# Patient Record
Sex: MALE | Race: WHITE | NOT HISPANIC OR LATINO | URBAN - NONMETROPOLITAN AREA
[De-identification: names, ages, dates, MRNs, and addresses within clinical notes are randomized per-mention and may not be internally consistent; named-entity substitution may affect disease eponyms.]

---

## 2017-07-05 ENCOUNTER — OPTICAL OFFICE (OUTPATIENT)
Dept: URBAN - NONMETROPOLITAN AREA CLINIC 4 | Facility: CLINIC | Age: 22
Setting detail: OPHTHALMOLOGY
End: 2017-07-05
Payer: COMMERCIAL

## 2017-07-05 DIAGNOSIS — H52.13: ICD-10-CM

## 2017-07-05 PROCEDURE — S0500 DISPOS CONT LENS: HCPCS | Performed by: OPTOMETRIST

## 2017-10-16 ENCOUNTER — DOCTOR'S OFFICE (OUTPATIENT)
Dept: URBAN - NONMETROPOLITAN AREA CLINIC 1 | Facility: CLINIC | Age: 22
Setting detail: OPHTHALMOLOGY
End: 2017-10-16
Payer: COMMERCIAL

## 2017-10-16 ENCOUNTER — OPTICAL OFFICE (OUTPATIENT)
Dept: URBAN - NONMETROPOLITAN AREA CLINIC 4 | Facility: CLINIC | Age: 22
Setting detail: OPHTHALMOLOGY
End: 2017-10-16
Payer: COMMERCIAL

## 2017-10-16 DIAGNOSIS — Z01.00: ICD-10-CM

## 2017-10-16 DIAGNOSIS — H52.13: ICD-10-CM

## 2017-10-16 PROCEDURE — 92310 CONTACT LENS FITTING OU: CPT | Performed by: OPTOMETRIST

## 2017-10-16 PROCEDURE — S0500 DISPOS CONT LENS: HCPCS | Performed by: OPTOMETRIST

## 2017-10-16 PROCEDURE — 92014 COMPRE OPH EXAM EST PT 1/>: CPT | Performed by: OPTOMETRIST

## 2017-10-16 ASSESSMENT — SPHEQUIV_DERIVED
OD_SPHEQUIV: -1.875
OS_SPHEQUIV: -1.625

## 2017-10-16 ASSESSMENT — REFRACTION_AUTOREFRACTION
OD_CYLINDER: -0.25
OD_SPHERE: -1.75
OD_AXIS: 169
OS_SPHERE: -1.25
OS_CYLINDER: -0.75
OS_AXIS: 018

## 2017-10-16 ASSESSMENT — REFRACTION_OUTSIDERX
OS_SPHERE: -1.50
OS_VA1: 20/20
OD_CYLINDER: -0.25
OD_VA2: 20/20
OU_VA: 20/20
OD_VA1: 20/20
OS_VA2: 20/20
OS_AXIS: 020
OS_CYLINDER: -0.50
OD_VA3: 20/
OD_SPHERE: -1.50
OS_VA3: 20/
OD_AXIS: 170

## 2017-10-16 ASSESSMENT — REFRACTION_MANIFEST
OD_VA3: 20/
OU_VA: 20/
OD_VA2: 20/
OS_VA3: 20/
OS_VA1: 20/
OD_VA2: 20/
OD_VA3: 20/
OS_VA2: 20/
OS_VA1: 20/
OS_VA2: 20/
OD_VA1: 20/
OD_VA1: 20/
OS_VA3: 20/
OU_VA: 20/

## 2017-10-16 ASSESSMENT — REFRACTION_CURRENTRX
OS_OVR_VA: 20/
OD_OVR_VA: 20/
OD_OVR_VA: 20/
OS_VPRISM_DIRECTION: SV
OD_SPHERE: -1.50
OD_VPRISM_DIRECTION: SV
OS_SPHERE: -1.50
OD_OVR_VA: 20/
OS_OVR_VA: 20/
OS_OVR_VA: 20/

## 2017-10-16 ASSESSMENT — VISUAL ACUITY
OS_BCVA: 20/25+2
OD_BCVA: 20/20-1

## 2017-10-16 ASSESSMENT — CONFRONTATIONAL VISUAL FIELD TEST (CVF)
OD_FINDINGS: FULL
OS_FINDINGS: FULL

## 2018-07-30 ENCOUNTER — OPTICAL OFFICE (OUTPATIENT)
Dept: URBAN - NONMETROPOLITAN AREA CLINIC 4 | Facility: CLINIC | Age: 23
Setting detail: OPHTHALMOLOGY
End: 2018-07-30

## 2018-07-30 DIAGNOSIS — H52.13: ICD-10-CM

## 2018-07-30 PROCEDURE — S0500 DISPOS CONT LENS: HCPCS | Performed by: OPTOMETRIST

## 2018-10-19 ENCOUNTER — DOCTOR'S OFFICE (OUTPATIENT)
Dept: URBAN - NONMETROPOLITAN AREA CLINIC 1 | Facility: CLINIC | Age: 23
Setting detail: OPHTHALMOLOGY
End: 2018-10-19
Payer: COMMERCIAL

## 2018-10-19 DIAGNOSIS — H52.13: ICD-10-CM

## 2018-10-19 PROCEDURE — 92014 COMPRE OPH EXAM EST PT 1/>: CPT | Performed by: OPTOMETRIST

## 2018-10-19 PROCEDURE — 92310 CONTACT LENS FITTING OU: CPT | Performed by: OPTOMETRIST

## 2018-10-19 ASSESSMENT — SPHEQUIV_DERIVED
OS_SPHEQUIV: -1.375
OD_SPHEQUIV: -1.5
OD_SPHEQUIV: -1.625
OS_SPHEQUIV: -1.375

## 2018-10-19 ASSESSMENT — REFRACTION_MANIFEST
OS_AXIS: 010
OD_VA2: 20/
OS_VA3: 20/
OS_VA1: 20/20
OS_VA2: 20/20
OD_VA3: 20/
OS_SPHERE: -1.00
OD_AXIS: 170
OD_VA1: 20/
OU_VA: 20/20
OS_VA3: 20/
OS_CYLINDER: -0.75
OD_SPHERE: -1.50
OD_VA3: 20/
OS_VA1: 20/
OD_CYLINDER: -0.25
OU_VA: 20/
OS_VA2: 20/
OD_VA2: 20/20
OD_VA1: 20/20

## 2018-10-19 ASSESSMENT — REFRACTION_CURRENTRX
OS_SPHERE: -1.50
OD_OVR_VA: 20/
OS_OVR_VA: 20/
OS_OVR_VA: 20/
OD_SPHERE: -1.50
OD_VPRISM_DIRECTION: SV
OS_OVR_VA: 20/
OD_OVR_VA: 20/
OD_OVR_VA: 20/
OS_VPRISM_DIRECTION: SV

## 2018-10-19 ASSESSMENT — REFRACTION_AUTOREFRACTION
OS_SPHERE: -0.75
OD_CYLINDER: -1.00
OD_SPHERE: -1.00
OS_AXIS: 011
OS_CYLINDER: -1.25
OD_AXIS: 002

## 2018-10-19 ASSESSMENT — VISUAL ACUITY
OS_BCVA: 20/30+1
OD_BCVA: 20/30+1

## 2018-10-19 ASSESSMENT — CONFRONTATIONAL VISUAL FIELD TEST (CVF)
OD_FINDINGS: FULL
OS_FINDINGS: FULL

## 2018-11-13 ENCOUNTER — OPTICAL OFFICE (OUTPATIENT)
Dept: URBAN - NONMETROPOLITAN AREA CLINIC 4 | Facility: CLINIC | Age: 23
Setting detail: OPHTHALMOLOGY
End: 2018-11-13

## 2018-11-13 DIAGNOSIS — H52.13: ICD-10-CM

## 2018-11-13 PROCEDURE — S0500 DISPOS CONT LENS: HCPCS | Performed by: OPTOMETRIST

## 2019-10-21 ENCOUNTER — DOCTOR'S OFFICE (OUTPATIENT)
Dept: URBAN - NONMETROPOLITAN AREA CLINIC 1 | Facility: CLINIC | Age: 24
Setting detail: OPHTHALMOLOGY
End: 2019-10-21
Payer: COMMERCIAL

## 2019-10-21 DIAGNOSIS — H52.13: ICD-10-CM

## 2019-10-21 PROCEDURE — 92014 COMPRE OPH EXAM EST PT 1/>: CPT | Performed by: OPTOMETRIST

## 2019-10-21 PROCEDURE — 92310 CONTACT LENS FITTING OU: CPT | Performed by: OPTOMETRIST

## 2019-10-21 ASSESSMENT — VISUAL ACUITY
OD_BCVA: 20/30-1
OS_BCVA: 20/30-1

## 2019-10-21 ASSESSMENT — REFRACTION_CURRENTRX
OD_VPRISM_DIRECTION: SV
OS_SPHERE: -1.00
OD_OVR_VA: 20/
OS_OVR_VA: 20/
OD_SPHERE: -1.50
OD_AXIS: 170
OS_OVR_VA: 20/
OD_OVR_VA: 20/
OS_OVR_VA: 20/
OD_OVR_VA: 20/
OS_CYLINDER: -0.75
OS_VPRISM_DIRECTION: SV
OS_AXIS: 010
OD_CYLINDER: -0.25

## 2019-10-21 ASSESSMENT — CONFRONTATIONAL VISUAL FIELD TEST (CVF)
OD_FINDINGS: FULL
OS_FINDINGS: FULL

## 2019-10-21 ASSESSMENT — REFRACTION_AUTOREFRACTION
OS_AXIS: 003
OS_CYLINDER: -1.25
OD_CYLINDER: -0.75
OS_SPHERE: -1.00
OD_AXIS: 171
OD_SPHERE: -1.00

## 2019-10-21 ASSESSMENT — REFRACTION_MANIFEST
OD_CYLINDER: -0.50
OS_VA3: 20/
OS_VA2: 20/
OD_VA3: 20/
OU_VA: 20/20
OS_SPHERE: -1.00
OS_VA2: 20/20
OD_SPHERE: -1.00
OS_AXIS: 005
OS_VA1: 20/20
OS_VA1: 20/
OS_VA3: 20/
OD_VA2: 20/20
OD_VA3: 20/
OU_VA: 20/
OD_VA2: 20/
OD_VA1: 20/
OS_CYLINDER: -1.00
OD_VA1: 20/20
OD_AXIS: 170

## 2019-10-21 ASSESSMENT — SPHEQUIV_DERIVED
OS_SPHEQUIV: -1.5
OD_SPHEQUIV: -1.25
OD_SPHEQUIV: -1.375
OS_SPHEQUIV: -1.625

## 2019-11-20 ENCOUNTER — OPTICAL OFFICE (OUTPATIENT)
Dept: URBAN - NONMETROPOLITAN AREA CLINIC 4 | Facility: CLINIC | Age: 24
Setting detail: OPHTHALMOLOGY
End: 2019-11-20

## 2019-11-20 DIAGNOSIS — H52.13: ICD-10-CM

## 2019-11-20 PROCEDURE — S0500 DISPOS CONT LENS: HCPCS | Performed by: OPTOMETRIST

## 2020-06-10 ENCOUNTER — OPTICAL OFFICE (OUTPATIENT)
Dept: URBAN - NONMETROPOLITAN AREA CLINIC 4 | Facility: CLINIC | Age: 25
Setting detail: OPHTHALMOLOGY
End: 2020-06-10

## 2020-06-10 DIAGNOSIS — H52.13: ICD-10-CM

## 2020-06-10 PROCEDURE — S0500 DISPOS CONT LENS: HCPCS | Performed by: OPTOMETRIST

## 2021-08-24 ENCOUNTER — DOCTOR'S OFFICE (OUTPATIENT)
Dept: URBAN - NONMETROPOLITAN AREA CLINIC 1 | Facility: CLINIC | Age: 26
Setting detail: OPHTHALMOLOGY
End: 2021-08-24
Payer: COMMERCIAL

## 2021-08-24 ENCOUNTER — OPTICAL OFFICE (OUTPATIENT)
Dept: URBAN - NONMETROPOLITAN AREA CLINIC 4 | Facility: CLINIC | Age: 26
Setting detail: OPHTHALMOLOGY
End: 2021-08-24
Payer: COMMERCIAL

## 2021-08-24 VITALS — HEIGHT: 60 IN

## 2021-08-24 DIAGNOSIS — Z01.00: ICD-10-CM

## 2021-08-24 DIAGNOSIS — H52.13: ICD-10-CM

## 2021-08-24 PROCEDURE — 92310 CONTACT LENS FITTING OU: CPT | Performed by: OPTOMETRIST

## 2021-08-24 PROCEDURE — 92014 COMPRE OPH EXAM EST PT 1/>: CPT | Performed by: OPTOMETRIST

## 2021-08-24 PROCEDURE — S0500 DISPOS CONT LENS: HCPCS | Performed by: OPTOMETRIST

## 2021-08-24 ASSESSMENT — TONOMETRY
OS_IOP_MMHG: 14
OD_IOP_MMHG: 14

## 2021-08-24 ASSESSMENT — REFRACTION_CURRENTRX
OD_AXIS: 170
OD_CYLINDER: -0.50
OD_SPHERE: -1.00
OD_OVR_VA: 20/
OS_AXIS: 005
OS_VPRISM_DIRECTION: SV
OS_OVR_VA: 20/
OD_VPRISM_DIRECTION: SV
OS_SPHERE: -1.00
OS_CYLINDER: -1.00

## 2021-08-24 ASSESSMENT — REFRACTION_AUTOREFRACTION
OS_AXIS: 006
OD_AXIS: 026
OS_SPHERE: +0.25
OD_CYLINDER: -0.50
OS_CYLINDER: -1.00
OD_SPHERE: +0.25

## 2021-08-24 ASSESSMENT — REFRACTION_MANIFEST
OD_VA2: 20/20
OS_VA1: 20/20
OS_CYLINDER: -1.00
OD_SPHERE: -1.00
OS_VA2: 20/20
OS_AXIS: 005
OD_AXIS: 170
OU_VA: 20/20
OD_CYLINDER: -0.50
OD_VA1: 20/20
OS_SPHERE: -1.00

## 2021-08-24 ASSESSMENT — CONFRONTATIONAL VISUAL FIELD TEST (CVF)
OS_FINDINGS: FULL
OD_FINDINGS: FULL

## 2021-08-24 ASSESSMENT — VISUAL ACUITY
OD_BCVA: 20/30-1
OS_BCVA: 20/30--2

## 2021-08-24 ASSESSMENT — SPHEQUIV_DERIVED
OS_SPHEQUIV: -0.25
OD_SPHEQUIV: 0
OD_SPHEQUIV: -1.25
OS_SPHEQUIV: -1.5

## 2023-01-18 ENCOUNTER — APPOINTMENT (EMERGENCY)
Dept: CT IMAGING | Facility: HOSPITAL | Age: 28
End: 2023-01-18

## 2023-01-18 ENCOUNTER — HOSPITAL ENCOUNTER (EMERGENCY)
Facility: HOSPITAL | Age: 28
Discharge: HOME | End: 2023-01-18
Attending: STUDENT IN AN ORGANIZED HEALTH CARE EDUCATION/TRAINING PROGRAM

## 2023-01-18 VITALS
DIASTOLIC BLOOD PRESSURE: 71 MMHG | OXYGEN SATURATION: 97 % | WEIGHT: 170 LBS | RESPIRATION RATE: 16 BRPM | BODY MASS INDEX: 23.03 KG/M2 | SYSTOLIC BLOOD PRESSURE: 138 MMHG | HEART RATE: 68 BPM | TEMPERATURE: 99 F | HEIGHT: 72 IN

## 2023-01-18 DIAGNOSIS — R10.31 RIGHT INGUINAL PAIN: Primary | ICD-10-CM

## 2023-01-18 LAB
ANION GAP SERPL CALCULATED.3IONS-SCNC: 7 MMOL/L (ref 4–13)
BASOPHILS # BLD AUTO: 0.04 THOUSANDS/ÂΜL (ref 0–0.1)
BASOPHILS NFR BLD AUTO: 1 % (ref 0–1)
BILIRUB UR QL STRIP: NEGATIVE
BUN SERPL-MCNC: 15 MG/DL (ref 5–25)
CALCIUM SERPL-MCNC: 9.5 MG/DL (ref 8.4–10.2)
CHLORIDE SERPL-SCNC: 103 MMOL/L (ref 96–108)
CLARITY UR: CLEAR
CO2 SERPL-SCNC: 28 MMOL/L (ref 21–32)
COLOR UR: YELLOW
CREAT SERPL-MCNC: 1.07 MG/DL (ref 0.6–1.3)
EOSINOPHIL # BLD AUTO: 0.11 THOUSAND/ÂΜL (ref 0–0.61)
EOSINOPHIL NFR BLD AUTO: 1 % (ref 0–6)
ERYTHROCYTE [DISTWIDTH] IN BLOOD BY AUTOMATED COUNT: 12.9 % (ref 11.6–15.1)
GFR SERPL CREATININE-BSD FRML MDRD: 94 ML/MIN/1.73SQ M
GLUCOSE SERPL-MCNC: 110 MG/DL (ref 65–140)
GLUCOSE UR STRIP-MCNC: NEGATIVE MG/DL
HCT VFR BLD AUTO: 46.5 % (ref 36.5–49.3)
HGB BLD-MCNC: 15 G/DL (ref 12–17)
HGB UR QL STRIP.AUTO: NEGATIVE
IMM GRANULOCYTES # BLD AUTO: 0.01 THOUSAND/UL (ref 0–0.2)
IMM GRANULOCYTES NFR BLD AUTO: 0 % (ref 0–2)
KETONES UR STRIP-MCNC: NEGATIVE MG/DL
LACTATE SERPL-SCNC: 1 MMOL/L (ref 0.5–2)
LEUKOCYTE ESTERASE UR QL STRIP: NEGATIVE
LYMPHOCYTES # BLD AUTO: 2.21 THOUSANDS/ÂΜL (ref 0.6–4.47)
LYMPHOCYTES NFR BLD AUTO: 29 % (ref 14–44)
MCH RBC QN AUTO: 28.4 PG (ref 26.8–34.3)
MCHC RBC AUTO-ENTMCNC: 32.3 G/DL (ref 31.4–37.4)
MCV RBC AUTO: 88 FL (ref 82–98)
MONOCYTES # BLD AUTO: 0.62 THOUSAND/ÂΜL (ref 0.17–1.22)
MONOCYTES NFR BLD AUTO: 8 % (ref 4–12)
NEUTROPHILS # BLD AUTO: 4.62 THOUSANDS/ÂΜL (ref 1.85–7.62)
NEUTS SEG NFR BLD AUTO: 61 % (ref 43–75)
NITRITE UR QL STRIP: NEGATIVE
NRBC BLD AUTO-RTO: 0 /100 WBCS
PH UR STRIP.AUTO: 6 [PH]
PLATELET # BLD AUTO: 297 THOUSANDS/UL (ref 149–390)
PMV BLD AUTO: 10 FL (ref 8.9–12.7)
POTASSIUM SERPL-SCNC: 3.6 MMOL/L (ref 3.5–5.3)
PROT UR STRIP-MCNC: NEGATIVE MG/DL
RBC # BLD AUTO: 5.29 MILLION/UL (ref 3.88–5.62)
SODIUM SERPL-SCNC: 138 MMOL/L (ref 135–147)
SP GR UR STRIP.AUTO: 1.01 (ref 1–1.03)
UROBILINOGEN UR QL STRIP.AUTO: 0.2 E.U./DL
WBC # BLD AUTO: 7.61 THOUSAND/UL (ref 4.31–10.16)

## 2023-01-18 RX ORDER — KETOROLAC TROMETHAMINE 30 MG/ML
15 INJECTION, SOLUTION INTRAMUSCULAR; INTRAVENOUS ONCE
Status: COMPLETED | OUTPATIENT
Start: 2023-01-18 | End: 2023-01-18

## 2023-01-18 RX ADMIN — KETOROLAC TROMETHAMINE 15 MG: 30 INJECTION, SOLUTION INTRAMUSCULAR at 19:52

## 2023-01-18 RX ADMIN — IOHEXOL 100 ML: 350 INJECTION, SOLUTION INTRAVENOUS at 20:36

## 2023-01-18 RX ADMIN — SODIUM CHLORIDE 1000 ML: 0.9 INJECTION, SOLUTION INTRAVENOUS at 19:54

## 2023-01-19 NOTE — DISCHARGE INSTRUCTIONS
Motrin 600 mg every 6 hours and Tylenol 1000 mg every 6 hours recommended for pain  You may have a reducible hernia  Follow up with your PCP or return to the ED if the pain recurs

## 2023-01-19 NOTE — ED PROVIDER NOTES
History  Chief Complaint   Patient presents with   • Abdominal Pain     Pt reports LRQ abd pain radiating into his groin since this morning with diarrhea       History provided by:  Patient  Abdominal Pain  Pain location:  RLQ  Pain quality: dull    Pain radiates to:  Scrotum  Pain severity:  Severe  Onset quality:  Sudden  Duration:  12 hours  Timing:  Constant  Progression:  Unchanged  Chronicity:  New  Context comment:  Developed RLQ abd pain this AM  Persistent throughout the day  Denies N/V but expresses mild diarrhea  Relieved by:  None tried  Worsened by:  Coughing, movement and palpation  Ineffective treatments:  None tried  Associated symptoms: anorexia, diarrhea and flatus    Associated symptoms: no chest pain, no chills, no constipation, no cough, no dysuria, no fatigue, no fever, no hematemesis, no hematochezia, no hematuria, no melena, no nausea, no shortness of breath, no sore throat and no vomiting      History reviewed  No pertinent past medical history  History reviewed  No pertinent surgical history  History reviewed  No pertinent family history  I have reviewed and agree with the history as documented  E-Cigarette/Vaping   • E-Cigarette Use Never User      E-Cigarette/Vaping Substances     Social History     Tobacco Use   • Smoking status: Never   • Smokeless tobacco: Never   Vaping Use   • Vaping Use: Never used   Substance Use Topics   • Alcohol use: Yes   • Drug use: Never     Review of Systems   Constitutional: Negative for activity change, appetite change, chills, diaphoresis, fatigue and fever  HENT: Negative for congestion, rhinorrhea, sinus pressure, sinus pain and sore throat  Eyes: Negative for photophobia, pain, discharge, redness and visual disturbance  Respiratory: Negative for cough, chest tightness, shortness of breath and wheezing  Cardiovascular: Negative for chest pain, palpitations and leg swelling     Gastrointestinal: Positive for abdominal pain, anorexia, diarrhea and flatus  Negative for abdominal distention, constipation, hematemesis, hematochezia, melena, nausea and vomiting  Genitourinary: Negative for difficulty urinating, dysuria, flank pain, frequency, hematuria, penile discharge, penile pain, penile swelling, scrotal swelling, testicular pain and urgency  Musculoskeletal: Negative for arthralgias, back pain, myalgias and neck pain  Skin: Negative for color change, pallor, rash and wound  Neurological: Negative for dizziness, syncope, weakness, light-headedness, numbness and headaches  Hematological: Does not bruise/bleed easily  Psychiatric/Behavioral: Negative for confusion and sleep disturbance  All other systems reviewed and are negative  Physical Exam  Physical Exam  Vitals and nursing note reviewed  Constitutional:       General: He is in acute distress  Appearance: He is not ill-appearing or toxic-appearing  HENT:      Head: Normocephalic and atraumatic  Right Ear: External ear normal       Left Ear: External ear normal       Nose: No congestion or rhinorrhea  Mouth/Throat:      Pharynx: No oropharyngeal exudate or posterior oropharyngeal erythema  Eyes:      General:         Right eye: No discharge  Left eye: No discharge  Conjunctiva/sclera: Conjunctivae normal    Cardiovascular:      Rate and Rhythm: Normal rate and regular rhythm  Pulses: Normal pulses  Heart sounds: Normal heart sounds  No murmur heard  Pulmonary:      Effort: Pulmonary effort is normal  No respiratory distress  Breath sounds: Normal breath sounds  No stridor  No wheezing, rhonchi or rales  Chest:      Chest wall: No tenderness  Abdominal:      General: Abdomen is flat  Bowel sounds are normal  There is no distension  Palpations: Abdomen is soft  There is no mass  Tenderness: There is abdominal tenderness in the right lower quadrant   There is no right CVA tenderness, left CVA tenderness, guarding or rebound  Hernia: No hernia is present  Genitourinary:     Penis: Normal        Testes:         Right: Mass, tenderness or swelling not present  Left: Mass, tenderness or swelling not present  Comments: TTP along the right inguinal; questionable right reducible inguinal hernia  No TTP along scrotum  No overlying skin changes  Musculoskeletal:         General: No swelling, tenderness, deformity or signs of injury  Cervical back: Neck supple  No tenderness  Right lower leg: No edema  Left lower leg: No edema  Skin:     General: Skin is warm and dry  Capillary Refill: Capillary refill takes less than 2 seconds  Coloration: Skin is not cyanotic, jaundiced, mottled or pale  Findings: No bruising, erythema or rash  Neurological:      General: No focal deficit present  Mental Status: He is alert and oriented to person, place, and time  Cranial Nerves: No cranial nerve deficit  Sensory: No sensory deficit  Motor: No weakness  Psychiatric:         Mood and Affect: Mood normal  Mood is not anxious or depressed  Behavior: Behavior normal          Thought Content:  Thought content normal          Judgment: Judgment normal        Vital Signs  ED Triage Vitals [01/18/23 1929]   Temperature Pulse Respirations Blood Pressure SpO2   99 °F (37 2 °C) 85 16 147/77 98 %      Temp Source Heart Rate Source Patient Position - Orthostatic VS BP Location FiO2 (%)   Oral Monitor Sitting Right arm --      Pain Score       --         Vitals:    01/18/23 1929   BP: 147/77   Pulse: 85   Patient Position - Orthostatic VS: Sitting     ED Medications  Medications   ketorolac (TORADOL) injection 15 mg (15 mg Intravenous Given 1/18/23 1952)   sodium chloride 0 9 % bolus 1,000 mL (0 mL Intravenous Stopped 1/18/23 2122)   iohexol (OMNIPAQUE) 350 MG/ML injection (SINGLE-DOSE) 100 mL (100 mL Intravenous Given 1/18/23 2036)     Diagnostic Studies  Results Reviewed Procedure Component Value Units Date/Time    Lactic acid, plasma [[id number]]  (Normal) Collected: 01/18/23 1950    Lab Status: Final result Specimen: Blood from Arm, Left Updated: 01/18/23 2016     LACTIC ACID 1 0 mmol/L     Narrative:      Result may be elevated if tourniquet was used during collection      Basic metabolic panel [[id number]] Collected: 01/18/23 1950    Lab Status: Final result Specimen: Blood from Arm, Left Updated: 01/18/23 2016     Sodium 138 mmol/L      Potassium 3 6 mmol/L      Chloride 103 mmol/L      CO2 28 mmol/L      ANION GAP 7 mmol/L      BUN 15 mg/dL      Creatinine 1 07 mg/dL      Glucose 110 mg/dL      Calcium 9 5 mg/dL      eGFR 94 ml/min/1 73sq m     Narrative:      [place] guidelines for Chronic Kidney Disease (CKD):   •  Stage 1 with normal or high GFR (GFR > 90 mL/min/1 73 square meters)  •  Stage 2 Mild CKD (GFR = 60-89 mL/min/1 73 square meters)  •  Stage 3A Moderate CKD (GFR = 45-59 mL/min/1 73 square meters)  •  Stage 3B Moderate CKD (GFR = 30-44 mL/min/1 73 square meters)  •  Stage 4 Severe CKD (GFR = 15-29 mL/min/1 73 square meters)  •  Stage 5 End Stage CKD (GFR <15 mL/min/1 73 square meters)  Note: GFR calculation is accurate only with a steady state creatinine    UA w Reflex to Microscopic w Reflex to Culture [[id number]] Collected: 01/18/23 1950    Lab Status: Final result Specimen: Urine, Clean Catch Updated: 01/18/23 2007     Color, UA Yellow     Clarity, UA Clear     Specific Gravity, UA 1 010     pH, UA 6 0     Leukocytes, UA Negative     Nitrite, UA Negative     Protein, UA Negative mg/dl      Glucose, UA Negative mg/dl      Ketones, UA Negative mg/dl      Urobilinogen, UA 0 2 E U /dl      Bilirubin, UA Negative     Occult Blood, UA Negative    CBC and differential [[id number]] Collected: 01/18/23 1950    Lab Status: Final result Specimen: Blood from Arm, Left Updated: 01/18/23 2000     WBC 7 61 Thousand/uL      RBC 5 29 Million/uL Hemoglobin 15 0 g/dL      Hematocrit 46 5 %      MCV 88 fL      MCH 28 4 pg      MCHC 32 3 g/dL      RDW 12 9 %      MPV 10 0 fL      Platelets 100 Thousands/uL      nRBC 0 /100 WBCs      Neutrophils Relative 61 %      Immat GRANS % 0 %      Lymphocytes Relative 29 %      Monocytes Relative 8 %      Eosinophils Relative 1 %      Basophils Relative 1 %      Neutrophils Absolute 4 62 Thousands/µL      Immature Grans Absolute 0 01 Thousand/uL      Lymphocytes Absolute 2 21 Thousands/µL      Monocytes Absolute 0 62 Thousand/µL      Eosinophils Absolute 0 11 Thousand/µL      Basophils Absolute 0 04 Thousands/µL              CT abdomen pelvis with contrast   Final Result by Hakeem Hawley MD (01/18 2105)      No acute inflammatory process identified  Workstation performed: [id number]                Procedures  Procedures     ED Course  ED Course as of 01/18/23 2130 Wed Jan 18, 2023 2016 No leukocytosis  Hemoglobin is within normal limits  No significant normalities noted on BMP  UA without signs of infection  Lactic acid is within normal limits  2113 No acute abnormalities noted on CT imaging      Medical Decision Making  History and clinical findings are most consistent with the below diagnosis/diagnoses  Laboratory and imaging interpretation above  On exam, there is a questionable reducible right inguinal hernia  No recurrence  No tenderness to palpation along the scrotum  Denies penile discharge  UA without signs of infection  Pain improved with IV Toradol  PCP follow-up encouraged  Recommendations and return precautions were discussed  All questions were addressed  The patient was stable for discharge  Right inguinal pain: acute illness or injury  Amount and/or Complexity of Data Reviewed  Labs: ordered  Radiology: ordered  Risk  Prescription drug management        Disposition  Final diagnoses:   Right inguinal pain     Time reflects when diagnosis was documented in both MDM as applicable and the Disposition within this note     Time User Action Codes Description Comment    1/18/2023  9:14 PM Dami Chano Add [R10 31] Right inguinal pain       ED Disposition     ED Disposition   Discharge    Condition   Stable    Date/Time   Wed Jan 18, 2023  9:14 PM    Comment   Khadijah Campo discharge to home/self care  Follow-up Information    None         Patient's Medications    No medications on file       No discharge procedures on file      PDMP Review     None          ED Provider  Electronically Signed by           Yocasta Olivares DO  01/18/23 7496

## 2023-03-10 ENCOUNTER — TELEPHONE (OUTPATIENT)
Dept: UROLOGY | Facility: AMBULATORY SURGERY CENTER | Age: 28
End: 2023-03-10

## 2023-03-10 NOTE — TELEPHONE ENCOUNTER
What is the reason for the patient’s appointment? NP- Groin Pain     Patient can be reached at [phone]    What office location does the patient prefer? [place]     Imaging/Lab Results: in Epic    Do we accept the patient's insurance or is the patient Self-Pay? [facility]    Has the patient had any previous Urologist(s)? No    Have patient records been requested? No    Has the patient had any outside testing done? No    Does the patient have a personal history of cancer?  No

## 2023-03-13 ENCOUNTER — OFFICE VISIT (OUTPATIENT)
Dept: UROLOGY | Facility: CLINIC | Age: 28
End: 2023-03-13

## 2023-03-13 VITALS — HEIGHT: 72 IN | BODY MASS INDEX: 23.84 KG/M2 | WEIGHT: 176 LBS

## 2023-03-13 DIAGNOSIS — R10.30 INGUINAL PAIN, UNSPECIFIED LATERALITY: Primary | ICD-10-CM

## 2023-03-13 DIAGNOSIS — N41.0 ACUTE PROSTATITIS: ICD-10-CM

## 2023-03-13 LAB
SL AMB  POCT GLUCOSE, UA: NORMAL
SL AMB LEUKOCYTE ESTERASE,UA: NORMAL
SL AMB POCT BILIRUBIN,UA: NORMAL
SL AMB POCT BLOOD,UA: NORMAL
SL AMB POCT CLARITY,UA: CLEAR
SL AMB POCT COLOR,UA: YELLOW
SL AMB POCT KETONES,UA: NORMAL
SL AMB POCT NITRITE,UA: NORMAL
SL AMB POCT PH,UA: 6
SL AMB POCT SPECIFIC GRAVITY,UA: 1.02
SL AMB POCT URINE PROTEIN: NORMAL
SL AMB POCT UROBILINOGEN: 0.2

## 2023-03-13 RX ORDER — DOXYCYCLINE HYCLATE 100 MG/1
100 CAPSULE ORAL EVERY 12 HOURS SCHEDULED
Qty: 20 CAPSULE | Refills: 0 | Status: SHIPPED | OUTPATIENT
Start: 2023-03-13 | End: 2023-03-23

## 2023-03-13 NOTE — PROGRESS NOTES
3/13/2023    No chief complaint on file  Assessment and Plan    32 y o  male new patient to office    1  Groin pain  · He reports acute onset of severe stabbing pain in the perineum with radiation into the right groin  He also reports retraction of bilateral testicles with acute onset of pain  · LIEN revealed a smooth symmetric prostate with mild tenderness not significant bogginess  We will treat him for suspected prostatitis and I have sent a prescription for doxycycline 100 mg PO BID for 10 days to his pharmacy  We will also check a US scrotum and testicles and he will follow-up in 2 weeks to reassess  History of Present Illness  Luann Alarcon is a 32 y o  male new patient to office  Here for evaluation of groin pain  He reports severe intermittent groin pain that has been ongoing for approximately 3 months  He reports this first started in December and experienced and episode in January as well  Argelia Blanton He was evaluated at [address] [place] [address] emergency department on 1/18/2023 for complaints of right groin pain  CT imaging was negative for any acute process  He reports a third occurrence of groin pain that started last Friday and has since started to improve  He reports that he woke up Friday morning with severe pain in the perineum with radiation of the pain into the right groin  He also reports retraction of both testicles with acute onset of pain  He denies any testicular pain, urinary symptoms, or blood in the urine  He describes the pain as stabbing  Denies any history of sexually transmitted infections, and denies any concern  Urine dip today was negative for infection  No history of injury, trauma, or surgery to abdomen, pelvis, or scrotum  Review of Systems   Constitutional: Negative for chills and fever  HENT: Negative for congestion and sore throat  Respiratory: Negative for cough and shortness of breath  Cardiovascular: Negative for chest pain and leg swelling     Gastrointestinal: Negative for abdominal pain, constipation, diarrhea, nausea and vomiting  Genitourinary: Negative for difficulty urinating, dysuria, frequency, hematuria, penile pain, scrotal swelling, testicular pain and urgency  Bilateral groin pain   Musculoskeletal: Negative for back pain and gait problem  Skin: Negative for wound  Allergic/Immunologic: Negative for immunocompromised state  Hematological: Does not bruise/bleed easily  AUA SYMPTOM SCORE    Flowsheet Row Most Recent Value   AUA SYMPTOM SCORE    How often have you had a sensation of not emptying your bladder completely after you finished urinating? 0 (P)     How often have you had to urinate again less than two hours after you finished urinating? 1 (P)     How often have you found you stopped and started again several times when you urinate? 0 (P)     How often have you found it difficult to postpone urination? 0 (P)     How often have you had a weak urinary stream? 0 (P)     How often have you had to push or strain to begin urination? 0 (P)     How many times did you most typically get up to urinate from the time you went to bed at night until the time you got up in the morning? 1 (P)     Quality of Life: If you were to spend the rest of your life with your urinary condition just the way it is now, how would you feel about that? 1 (P)     AUA SYMPTOM SCORE 2 (P)           Vitals  Vitals:    03/13/23 0847   BP: (P) 132/80   BP Location: (P) Left arm   Patient Position: (P) Sitting   Cuff Size: (P) Standard   Pulse: (P) 92   Temp: (!) (P) 97 4 °F (36 3 °C)   SpO2: (P) 99%   Weight: 79 8 kg (176 lb)   Height: 6' (1 829 m)       Physical Exam  Vitals reviewed  Constitutional:       General: He is not in acute distress  Appearance: Normal appearance  He is not ill-appearing or toxic-appearing  HENT:      Head: Normocephalic and atraumatic  Eyes:      General: No scleral icterus       Conjunctiva/sclera: Conjunctivae normal  Cardiovascular:      Rate and Rhythm: Normal rate  Pulmonary:      Effort: Pulmonary effort is normal  No respiratory distress  Abdominal:      Tenderness: There is no right CVA tenderness or left CVA tenderness  Hernia: No hernia is present  Genitourinary:     Comments: Normal circumcised phallus, testicles are descended bilaterally with normal limits  Rectal exam reveals normal tone, no masses and his prostate is smooth, symmetric, and benign  No nodules  There is mild tenderness with palpation of the prostate  Musculoskeletal:      Cervical back: Normal range of motion  Right lower leg: No edema  Left lower leg: No edema  Skin:     General: Skin is warm and dry  Coloration: Skin is not jaundiced or pale  Neurological:      General: No focal deficit present  Mental Status: He is alert and oriented to person, place, and time  Mental status is at baseline  Gait: Gait normal    Psychiatric:         Mood and Affect: Mood normal          Behavior: Behavior normal          Thought Content: Thought content normal          Judgment: Judgment normal          Past History  History reviewed  No pertinent past medical history  Social History     Socioeconomic History   • Marital status: Single     Spouse name: None   • Number of children: None   • Years of education: None   • Highest education level: None   Occupational History   • None   Tobacco Use   • Smoking status: Never   • Smokeless tobacco: Never   Vaping Use   • Vaping Use: Never used   Substance and Sexual Activity   • Alcohol use:  Yes   • Drug use: Never   • Sexual activity: None   Other Topics Concern   • None   Social History Narrative   • None     Social Determinants of Health     Financial Resource Strain: Not on file   Food Insecurity: Not on file   Transportation Needs: Not on file   Physical Activity: Not on file   Stress: Not on file   Social Connections: Not on file   Intimate Partner Violence: Not on file Housing Stability: Not on file     Social History     Tobacco Use   Smoking Status Never   Smokeless Tobacco Never     History reviewed  No pertinent family history  The following portions of the patient's history were reviewed and updated as appropriate allergies, current medications, past medical history, past social history, past surgical history and problem list    Imagin/18/2023  CT ABDOMEN AND PELVIS WITH IV CONTRAST     INDICATION:   RLQ abdominal pain (Age >= 14y)  Right lower quadrant abdominal pain  possible inguinal hernia        COMPARISON:  None      TECHNIQUE:  CT examination of the abdomen and pelvis was performed  Axial, sagittal, and coronal 2D reformatted images were created from the source data and submitted for interpretation      Radiation dose length product (DLP) for this visit:  675 mGy-cm   This examination, like all CT scans performed in the [facility], was performed utilizing techniques to minimize radiation dose exposure, including the use of iterative   reconstruction and automated exposure control      IV Contrast:  100 mL of iohexol (OMNIPAQUE)  Enteric Contrast:  Enteric contrast was not administered      FINDINGS:     ABDOMEN     LOWER CHEST:  No clinically significant abnormality identified in the visualized lower chest      LIVER/BILIARY TREE:  Unremarkable      GALLBLADDER:  No calcified gallstones  No pericholecystic inflammatory change      SPLEEN:  Unremarkable      PANCREAS:  Unremarkable      ADRENAL GLANDS:  Unremarkable      KIDNEYS/URETERS:  Unremarkable  No hydronephrosis      STOMACH AND BOWEL:  Unremarkable      APPENDIX:  A normal appendix was visualized      ABDOMINOPELVIC CAVITY:  No ascites  No pneumoperitoneum    No lymphadenopathy      VESSELS:  Unremarkable for patient's age      PELVIS     REPRODUCTIVE ORGANS:  Unremarkable for patient's age      URINARY BLADDER:  Unremarkable      ABDOMINAL WALL/INGUINAL REGIONS: Unremarkable      OSSEOUS STRUCTURES:  No acute fracture or destructive osseous lesion      IMPRESSION:     No acute inflammatory process identified  Results  Recent Results (from the past 1 hour(s))   POCT urine dip auto non-scope    Collection Time: 03/13/23  8:54 AM   Result Value Ref Range     COLOR,UA yellow     CLARITY,UA clear     SPECIFIC GRAVITY,UA 1 020      PH,UA 6 0     LEUKOCYTE ESTERASE,UA neg     NITRITE,UA neg     GLUCOSE, UA neg     KETONES,UA neg     BILIRUBIN,UA neg     BLOOD,UA neg     POCT URINE PROTEIN neg     SL AMB POCT UROBILINOGEN 0 2    ]  No results found for: PSA  Lab Results   Component Value Date    CALCIUM 9 5 01/18/2023    K 3 6 01/18/2023    CO2 28 01/18/2023     01/18/2023    BUN 15 01/18/2023    CREATININE 1 07 01/18/2023     Lab Results   Component Value Date    WBC 7 61 01/18/2023    HGB 15 0 01/18/2023    HCT 46 5 01/18/2023    MCV 88 01/18/2023     01/18/2023       Please Note:  Voice dictation software has been used to create this document  There may be inadvertent transcriptions errors       NAN Dunn 03/13/23

## 2023-03-14 LAB — BACTERIA UR CULT: NORMAL

## 2023-03-15 ENCOUNTER — TELEPHONE (OUTPATIENT)
Dept: UROLOGY | Facility: CLINIC | Age: 28
End: 2023-03-15

## 2023-03-15 NOTE — TELEPHONE ENCOUNTER
----- Message from Aquilino U  79  sent at 3/15/2023  7:35 AM EDT -----  Please let patient know his urine culture was negative  I do recommend he continue with doxycycline as prescribed

## 2023-03-15 NOTE — TELEPHONE ENCOUNTER
Call to patient, lmom regarding results  Asked patient to call office with any questions or concerns, office number provided

## 2023-03-15 NOTE — RESULT ENCOUNTER NOTE
Please let patient know his urine culture was negative  I do recommend he continue with doxycycline as prescribed

## 2023-03-18 ENCOUNTER — HOSPITAL ENCOUNTER (OUTPATIENT)
Dept: ULTRASOUND IMAGING | Facility: HOSPITAL | Age: 28
Discharge: HOME | End: 2023-03-18

## 2023-03-18 DIAGNOSIS — R10.30 INGUINAL PAIN, UNSPECIFIED LATERALITY: ICD-10-CM

## 2023-03-23 NOTE — RESULT ENCOUNTER NOTE
Please let patient know that his scrotal ultrasound showed a small bilateral epididymal cyst   These are benign and do not require any further work-up  Otherwise his exam was normal without any evidence of inguinal hernia bilaterally

## 2023-03-31 RX ORDER — DOXYCYCLINE HYCLATE 100 MG/1
1 CAPSULE ORAL EVERY 12 HOURS
COMMUNITY
Start: 2023-03-13 | End: 2024-03-12

## 2023-04-03 ENCOUNTER — OFFICE VISIT (OUTPATIENT)
Dept: UROLOGY | Facility: CLINIC | Age: 28
End: 2023-04-03

## 2023-04-03 VITALS
HEIGHT: 72 IN | HEART RATE: 76 BPM | WEIGHT: 174 LBS | TEMPERATURE: 98 F | DIASTOLIC BLOOD PRESSURE: 88 MMHG | SYSTOLIC BLOOD PRESSURE: 120 MMHG | OXYGEN SATURATION: 99 % | BODY MASS INDEX: 23.57 KG/M2

## 2023-04-03 DIAGNOSIS — R10.30 INGUINAL PAIN, UNSPECIFIED LATERALITY: Primary | ICD-10-CM

## 2023-04-03 DIAGNOSIS — N41.0 ACUTE PROSTATITIS: ICD-10-CM

## 2023-04-03 NOTE — PROGRESS NOTES
4/3/2023    Chief Complaint   Patient presents with   • Follow-up     Rt inguinal pain 0/10       Assessment and Plan    32 y o  male     1  Groin pain  · Treated for suspected prostatitis with symptoms consistent of perineal pain with radiation to the groin  Does have improved after completion of doxycycline 100 mg twice daily for 10 days  · Ultrasound scrotum and testicles and groin showed small bilateral epididymal cyst otherwise unremarkable exam   · He may also have a slight component of hyperactive cremasteric reflex as he continues to experience intermittent retraction of bilateral testicles most often in the morning  He denies any pain associated with this  I recommend continued conservative management he can follow-up as needed  Subjective:    He presents today reporting doing well  He did experience 1 episode of lateral testicular retraction approximate 1 week ago  This occurred in the morning and he reported no pain in his testicles returned to normal position  Denies any recurrent episodes of perineal pain concerning for acute prostatitis  History of Present Illness  Ana Haddad is a 32 y o  male here for follow-up evaluation of pain  He was initially seen by me on 3/13/2023 for evaluation of severe intermittent groin pain that have been ongoing for approximately 3 months  This for started in December and experienced a additional episode in January as well  He was evaluated at [address] [place] [address] emergency department on 1/18/2023 for complaints of right groin pain  CT imaging was negative for any acute process       He reports a third occurrence of groin pain several days prior to his initial office visit with me  He reports that he woke up with severe pain in the perineum with radiation of the pain into the right groin  He also reports retraction of both testicles with acute onset of pain  He denies any testicular pain, urinary symptoms, or blood in the urine   He describes the pain as stabbing  Denies any history of sexually transmitted infections, and denies any concern  Urine dip today was negative for infection  No history of injury, trauma, or surgery to abdomen, pelvis, or scrotum  He was treated for suspected prostatitis and prescribed doxycycline 100 mg twice daily for 10 days and a ultrasound scrotum testicles was also completed  Ultrasound scrotum and testicles and groin completed on 3/18/2023 and showed small epididymal cyst otherwise unremarkable testicles  Otherwise unremarkable exam             Review of Systems   Constitutional: Negative for chills and fever  HENT: Negative for congestion and sore throat  Respiratory: Negative for cough and shortness of breath  Cardiovascular: Negative for chest pain and leg swelling  Gastrointestinal: Negative for abdominal pain, constipation and diarrhea  Genitourinary: Negative for difficulty urinating, dysuria, frequency, hematuria and urgency  Musculoskeletal: Negative for back pain and gait problem  Skin: Negative for wound  Allergic/Immunologic: Negative for immunocompromised state  Hematological: Does not bruise/bleed easily             AUA SYMPTOM SCORE    Flowsheet Row Most Recent Value   AUA SYMPTOM SCORE    How often have you had a sensation of not emptying your bladder completely after you finished urinating? 1 (P)     How often have you had to urinate again less than two hours after you finished urinating? 1 (P)     How often have you found you stopped and started again several times when you urinate? 0 (P)     How often have you found it difficult to postpone urination? 3 (P)     How often have you had a weak urinary stream? 0 (P)     How often have you had to push or strain to begin urination? 0 (P)     How many times did you most typically get up to urinate from the time you went to bed at night until the time you got up in the morning? 1 (P)     Quality of Life: If you were to spend the rest of your life with your urinary condition just the way it is now, how would you feel about that? 1 (P)     AUA SYMPTOM SCORE 6 (P)           Vitals  Vitals:    04/03/23 1032   BP: 120/88   BP Location: Left arm   Patient Position: Sitting   Cuff Size: Standard   Pulse: 76   Temp: 98 °F (36 7 °C)   SpO2: 99%   Weight: 78 9 kg (174 lb)   Height: 6' (1 829 m)       Physical Exam  Vitals reviewed  Constitutional:       General: He is not in acute distress  Appearance: Normal appearance  He is not ill-appearing or toxic-appearing  HENT:      Head: Normocephalic and atraumatic  Eyes:      General: No scleral icterus  Conjunctiva/sclera: Conjunctivae normal    Cardiovascular:      Rate and Rhythm: Normal rate  Pulmonary:      Effort: Pulmonary effort is normal  No respiratory distress  Abdominal:      Tenderness: There is no right CVA tenderness or left CVA tenderness  Hernia: No hernia is present  Musculoskeletal:      Cervical back: Normal range of motion  Right lower leg: No edema  Left lower leg: No edema  Skin:     General: Skin is warm and dry  Coloration: Skin is not jaundiced or pale  Neurological:      General: No focal deficit present  Mental Status: He is alert and oriented to person, place, and time  Mental status is at baseline  Gait: Gait normal    Psychiatric:         Mood and Affect: Mood normal          Behavior: Behavior normal          Thought Content: Thought content normal          Judgment: Judgment normal          Past History  History reviewed  No pertinent past medical history    Social History     Socioeconomic History   • Marital status: Single     Spouse name: None   • Number of children: None   • Years of education: None   • Highest education level: None   Occupational History   • None   Tobacco Use   • Smoking status: Some Days     Types: Pipe, Cigars   • Smokeless tobacco: Never   Vaping Use   • Vaping Use: Never used   Substance and Sexual Activity   • Alcohol use: Yes     Comment: Occasional   • Drug use: Never   • Sexual activity: Yes     Partners: Male     Birth control/protection: Condom Male   Other Topics Concern   • None   Social History Narrative   • None     Social Determinants of Health     Financial Resource Strain: Not on file   Food Insecurity: Not on file   Transportation Needs: Not on file   Physical Activity: Not on file   Stress: Not on file   Social Connections: Not on file   Intimate Partner Violence: Not on file   Housing Stability: Not on file     Social History     Tobacco Use   Smoking Status Some Days   • Types: Pipe, Cigars   Smokeless Tobacco Never     Family History   Problem Relation Age of Onset   • Hypertension Father        The following portions of the patient's history were reviewed and updated as appropriate allergies, current medications, past medical history, past social history, past surgical history and problem list    Imaging:    3/18/2023  SCROTAL ULTRASOUND     INDICATION:    R10 30: Lower abdominal pain, unspecified  Right groin pain; 3 episodes including December January March     COMPARISON: 1/18/2023     TECHNIQUE:   Ultrasound the scrotal contents was performed with a high frequency linear transducer utilizing volumetric sweep imaging as well as standard still image techniques  Imaging performed in longitudinal and transverse orientation  Color and   spectral Doppler evaluation also performed bilaterally      FINDINGS:     TESTES:   Testes are symmetric and normal in size      RIGHT testis = 3 9 x 2 1 x 2 8 cm  Volume 12 0 mL  Normal contour with homogeneous smooth echotexture  No intratesticular mass lesion or calcifications      LEFT testis = 3 3 x 1 9 x 2 7 cm  Volume 8 9 mL  Normal contour with homogeneous smooth echotexture  No intratesticular mass lesion or calcifications      Doppler flow within both testes is present and symmetric      EPIDIDYMIDES:   Normal Size    Doppler ultrasound demonstrates normal blood flow  Small epididymal cysts are identified bilaterally        HYDROCELE:  No significant fluid present      VARICOCELE:  None present      SCROTUM:  Scrotal thickness and appearance within normal limits  No evidence for extratesticular mass or hernia demonstrated      The right groin region was examined with a linear probe  No hernia is seen  Normal morphologic lymph node is identified  Similar node is seen on the left groin comparison      IMPRESSION:     Small epididymal cyst   Unremarkable testes      Normal morphologic lymph nodes in the groin no other abnormality is utilized  Results  No results found for this or any previous visit (from the past 1 hour(s))  ]  No results found for: PSA  Lab Results   Component Value Date    CALCIUM 9 5 01/18/2023    K 3 6 01/18/2023    CO2 28 01/18/2023     01/18/2023    BUN 15 01/18/2023    CREATININE 1 07 01/18/2023     Lab Results   Component Value Date    WBC 7 61 01/18/2023    HGB 15 0 01/18/2023    HCT 46 5 01/18/2023    MCV 88 01/18/2023     01/18/2023       Please Note:  Voice dictation software has been used to create this document  There may be inadvertent transcriptions errors       NAN Jay 04/03/23

## 2023-09-29 ENCOUNTER — APPOINTMENT (EMERGENCY)
Dept: ULTRASOUND IMAGING | Facility: HOSPITAL | Age: 28
End: 2023-09-29
Payer: COMMERCIAL

## 2023-09-29 ENCOUNTER — HOSPITAL ENCOUNTER (EMERGENCY)
Facility: HOSPITAL | Age: 28
Discharge: HOME | End: 2023-09-29
Attending: EMERGENCY MEDICINE
Payer: COMMERCIAL

## 2023-09-29 VITALS
BODY MASS INDEX: 23.7 KG/M2 | HEIGHT: 72 IN | TEMPERATURE: 98.4 F | SYSTOLIC BLOOD PRESSURE: 130 MMHG | RESPIRATION RATE: 18 BRPM | WEIGHT: 175 LBS | HEART RATE: 79 BPM | OXYGEN SATURATION: 98 % | DIASTOLIC BLOOD PRESSURE: 69 MMHG

## 2023-09-29 DIAGNOSIS — N45.1 EPIDIDYMITIS: Primary | ICD-10-CM

## 2023-09-29 DIAGNOSIS — N50.811 PAIN IN RIGHT TESTICLE: ICD-10-CM

## 2023-09-29 LAB
BILIRUB UR QL STRIP: ABNORMAL
CLARITY UR: CLEAR
COLOR UR: YELLOW
GLUCOSE UR STRIP-MCNC: NEGATIVE MG/DL
HGB UR QL STRIP.AUTO: NEGATIVE
KETONES UR STRIP-MCNC: NEGATIVE MG/DL
LEUKOCYTE ESTERASE UR QL STRIP: NEGATIVE
NITRITE UR QL STRIP: NEGATIVE
PH UR STRIP.AUTO: 7 [PH]
PROT UR STRIP-MCNC: NEGATIVE MG/DL
SP GR UR STRIP.AUTO: 1.02 (ref 1–1.03)
UROBILINOGEN UR QL STRIP.AUTO: 1 E.U./DL

## 2023-09-29 PROCEDURE — 87591 N.GONORRHOEAE DNA AMP PROB: CPT | Performed by: EMERGENCY MEDICINE

## 2023-09-29 PROCEDURE — 76870 US EXAM SCROTUM: CPT

## 2023-09-29 PROCEDURE — 87491 CHLMYD TRACH DNA AMP PROBE: CPT | Performed by: EMERGENCY MEDICINE

## 2023-09-29 PROCEDURE — 99284 EMERGENCY DEPT VISIT MOD MDM: CPT | Performed by: EMERGENCY MEDICINE

## 2023-09-29 PROCEDURE — 81003 URINALYSIS AUTO W/O SCOPE: CPT | Performed by: EMERGENCY MEDICINE

## 2023-09-29 PROCEDURE — 99283 EMERGENCY DEPT VISIT LOW MDM: CPT

## 2023-09-29 RX ORDER — CIPROFLOXACIN 500 MG/1
500 TABLET, FILM COATED ORAL ONCE
Status: COMPLETED | OUTPATIENT
Start: 2023-09-29 | End: 2023-09-29

## 2023-09-29 RX ORDER — CIPROFLOXACIN 500 MG/1
500 TABLET, FILM COATED ORAL 2 TIMES DAILY
Qty: 14 TABLET | Refills: 0 | Status: SHIPPED | OUTPATIENT
Start: 2023-09-29 | End: 2023-10-06

## 2023-09-29 RX ADMIN — CIPROFLOXACIN HYDROCHLORIDE 500 MG: 500 TABLET, FILM COATED ORAL at 14:16

## 2023-09-29 NOTE — ED PROVIDER NOTES
History  Chief Complaint   Patient presents with   • Groin Pain     Lump in groin that started at 0830 this am.  Pt reports that his urologist said " its and overactive muscle that keeps minh". History provided by:  Medical records and patient  Groin Pain  Presenting symptoms: no dysuria, no penile discharge and no penile pain    Context comment:  Patient develops intermittent right testicular pain for the past several months, has seen by general surgery and urology, no hernia was noted. Return of the pain 3 days ago, constant, feels the right testicle is pulled up  Relieved by:  Nothing  Worsened by:  Nothing  Ineffective treatments:  None tried  Associated symptoms: groin pain    Associated symptoms: no abdominal pain, no fever, no hematuria, no nausea, no penile swelling, no scrotal swelling and no vomiting        Prior to Admission Medications   Prescriptions Last Dose Informant Patient Reported? Taking?   doxycycline hyclate (VIBRAMYCIN) 100 mg capsule Not Taking  Yes No   Sig: Take 1 capsule by mouth every 12 (twelve) hours   Patient not taking: Reported on 4/3/2023      Facility-Administered Medications: None       History reviewed. No pertinent past medical history. History reviewed. No pertinent surgical history. Family History   Problem Relation Age of Onset   • Hypertension Father      I have reviewed and agree with the history as documented. E-Cigarette/Vaping   • E-Cigarette Use Never User      E-Cigarette/Vaping Substances     Social History     Tobacco Use   • Smoking status: Some Days     Types: Pipe, Cigars   • Smokeless tobacco: Never   Vaping Use   • Vaping Use: Never used   Substance Use Topics   • Alcohol use: Yes     Comment: Occasional   • Drug use: Never       Review of Systems   Constitutional: Negative for appetite change, chills, fatigue and fever. HENT: Negative for ear pain, rhinorrhea, sore throat and trouble swallowing.     Eyes: Negative for pain, discharge and visual disturbance. Respiratory: Negative for cough, chest tightness and shortness of breath. Cardiovascular: Negative for chest pain and palpitations. Gastrointestinal: Negative for abdominal pain, nausea and vomiting. Endocrine: Negative for polydipsia, polyphagia and polyuria. Genitourinary: Positive for testicular pain. Negative for difficulty urinating, dysuria, hematuria, penile discharge, penile pain, penile swelling and scrotal swelling. Musculoskeletal: Negative for arthralgias and back pain. Skin: Negative for color change and rash. Allergic/Immunologic: Negative for immunocompromised state. Neurological: Negative for dizziness, seizures, syncope, weakness and headaches. Hematological: Negative for adenopathy. Psychiatric/Behavioral: Negative for confusion and dysphoric mood. All other systems reviewed and are negative. Physical Exam  Physical Exam  Vitals and nursing note reviewed. Constitutional:       General: He is not in acute distress. Appearance: Normal appearance. He is not ill-appearing, toxic-appearing or diaphoretic. HENT:      Head: Normocephalic and atraumatic. Nose: Nose normal. No congestion or rhinorrhea. Mouth/Throat:      Mouth: Mucous membranes are moist.      Pharynx: Oropharynx is clear. No oropharyngeal exudate or posterior oropharyngeal erythema. Eyes:      General:         Right eye: No discharge. Left eye: No discharge. Cardiovascular:      Rate and Rhythm: Normal rate and regular rhythm. Pulses: Normal pulses. Heart sounds: Normal heart sounds. No murmur heard. No gallop. Pulmonary:      Effort: Pulmonary effort is normal. No respiratory distress. Breath sounds: Normal breath sounds. No stridor. No wheezing, rhonchi or rales. Chest:      Chest wall: No tenderness. Abdominal:      General: Bowel sounds are normal. There is no distension. Palpations: Abdomen is soft. There is no mass. Tenderness: There is no abdominal tenderness. There is no right CVA tenderness, left CVA tenderness, guarding or rebound. Hernia: No hernia is present. Genitourinary:     Penis: Normal.       Comments: Mild tenderness to palpation of the right testicle, there is no gross swelling but there is some fullness to the epididymal process on the right-hand side, concerning for possible epididymal head cyst.  Musculoskeletal:         General: Normal range of motion. Cervical back: Normal range of motion and neck supple. Skin:     General: Skin is warm and dry. Capillary Refill: Capillary refill takes less than 2 seconds. Neurological:      General: No focal deficit present. Mental Status: He is alert and oriented to person, place, and time. Cranial Nerves: No cranial nerve deficit. Sensory: No sensory deficit. Motor: No weakness. Coordination: Coordination normal.      Gait: Gait normal.      Deep Tendon Reflexes: Reflexes normal.   Psychiatric:         Mood and Affect: Mood normal.         Behavior: Behavior normal.         Thought Content:  Thought content normal.         Judgment: Judgment normal.         Vital Signs  ED Triage Vitals [09/29/23 1118]   Temperature Pulse Respirations Blood Pressure SpO2   98.4 °F (36.9 °C) 79 16 (!) 172/104 99 %      Temp Source Heart Rate Source Patient Position - Orthostatic VS BP Location FiO2 (%)   Temporal Monitor Sitting Left arm --      Pain Score       8           Vitals:    09/29/23 1118 09/29/23 1330   BP: (!) 172/104 130/69   Pulse: 79 79   Patient Position - Orthostatic VS: Sitting Lying         Visual Acuity      ED Medications  Medications   ciprofloxacin (CIPRO) tablet 500 mg (500 mg Oral Given 9/29/23 1416)       Diagnostic Studies  Results Reviewed     Procedure Component Value Units Date/Time    UA (URINE) with reflex to Scope [[id number]]  (Abnormal) Collected: 09/29/23 1137    Lab Status: Final result Specimen: Urine, Clean Catch Updated: 09/29/23 1150     Color, UA Yellow     Clarity, UA Clear     Specific Gravity, UA 1.020     pH, UA 7.0     Leukocytes, UA Negative     Nitrite, UA Negative     Protein, UA Negative mg/dl      Glucose, UA Negative mg/dl      Ketones, UA Negative mg/dl      Urobilinogen, UA 1.0 E.U./dl      Bilirubin, UA Small     Occult Blood, UA Negative    Chlamydia/GC amplified DNA by PCR [[id number]] Collected: 09/29/23 1137    Lab Status: In process Specimen: Urine, Other Updated: 09/29/23 1141                 US scrotum and testicles   Final Result by Van Ritter MD (09/29 1327)      Enlarged and hyperemic right epididymis, most suggestive of epididymitis. There is no evidence of intratesticular mass or torsion      Workstation performed: [id number]                    Procedures  Procedures         ED Course                               SBIRT 20yo+    Flowsheet Row Most Recent Value   Initial Alcohol Screen: US AUDIT-C     1. How often do you have a drink containing alcohol? 0 Filed at: 09/29/2023 1118   2. How many drinks containing alcohol do you have on a typical day you are drinking? 0 Filed at: 09/29/2023 1118   3a. Male UNDER 65: How often do you have five or more drinks on one occasion? 0 Filed at: 09/29/2023 1118   3b. FEMALE Any Age, or MALE 65+: How often do you have 4 or more drinks on one occassion? 0 Filed at: 09/29/2023 1118   Audit-C Score 0 Filed at: 09/29/2023 1118   YOVANI: How many times in the past year have you. .. Used an illegal drug or used a prescription medication for non-medical reasons? Never Filed at: 09/29/2023 1118                    Medical Decision Making  1119: Patient appears well, vital signs reviewed. Patient has chronic right testicular pain intermittent in nature. Patient has been evaluated on several occasions for similar. Patient is also reporting some fullness to the right testicle. The discomfort has been present for the past 3 days.   There is some fullness to the epididymal process concerning for epididymal head cyst.  Patient denies STD concerns. Check urinalysis and urine probes for GC/chlamydia. Check testicular ultrasound. 1300: Ultrasound reviewed. Labs reviewed. Plan to start on antibiotics for right epididymitis. Epididymitis: acute illness or injury  Pain in right testicle: acute illness or injury  Amount and/or Complexity of Data Reviewed  Labs: ordered. Radiology: ordered and independent interpretation performed. Details: Ultrasound scrotum and testicle right epididymitis      Risk  Prescription drug management. Disposition  Final diagnoses:   Epididymitis   Pain in right testicle     Time reflects when diagnosis was documented in both MDM as applicable and the Disposition within this note     Time User Action Codes Description Comment    9/29/2023  2:06 PM Daryl Proud Add [N45.1] Epididymitis     9/29/2023  2:06 PM Daryl Proud Add [N50.811] Pain in right testicle       ED Disposition     ED Disposition   Discharge    Condition   Stable    Date/Time   Fri Sep 29, 2023  2:06 PM    Comment   Charly Duran discharge to home/self care.                Follow-up Information     Follow up With Specialties Details Why Contact Info [facility] Schedule an appointment as soon as possible for a visit   [address] [place] [phone]  [address] [place] [address] [facility] [address] [place] [address], [address] [place] [address], INC.[address]          Discharge Medication List as of 9/29/2023  2:07 PM      START taking these medications    Details   ciprofloxacin (CIPRO) 500 mg tablet Take 1 tablet (500 mg total) by mouth 2 (two) times a day for 7 days, Starting Fri 9/29/2023, Until Fri 10/6/2023, Normal         CONTINUE these medications which have NOT CHANGED    Details   doxycycline hyclate (VIBRAMYCIN) 100 mg capsule Take 1 capsule by mouth every 12 (twelve) hours, Starting Mon 3/13/2023, Until Tue 3/12/2024, Historical Med             No discharge procedures on file.     PDMP Review     None          ED Provider  Electronically Signed by           Lcay Vaca MD  09/30/23 5692

## 2023-10-02 LAB
C TRACH DNA SPEC QL NAA+PROBE: NEGATIVE
N GONORRHOEA DNA SPEC QL NAA+PROBE: NEGATIVE

## 2024-11-13 NOTE — Clinical Note
Lianet Gonzalez was seen and treated in our emergency department on 9/29/2023. Diagnosis:     Marcela Mazariegos  may return to work on return date. He may return on this date: 09/30/2023         If you have any questions or concerns, please don't hesitate to call.       Marysol Carrillo MD    ______________________________           _______________          _______________  Hospital Representative                              Date                                Time Unit RN to OR RN